# Patient Record
Sex: MALE | Race: BLACK OR AFRICAN AMERICAN | Employment: FULL TIME | URBAN - METROPOLITAN AREA
[De-identification: names, ages, dates, MRNs, and addresses within clinical notes are randomized per-mention and may not be internally consistent; named-entity substitution may affect disease eponyms.]

---

## 2021-11-05 ENCOUNTER — HOSPITAL ENCOUNTER (EMERGENCY)
Age: 56
Discharge: HOME | End: 2021-11-05
Attending: EMERGENCY MEDICINE

## 2021-11-05 ENCOUNTER — APPOINTMENT (OUTPATIENT)
Dept: GENERAL RADIOLOGY | Age: 56
End: 2021-11-05

## 2021-11-05 VITALS
HEART RATE: 75 BPM | RESPIRATION RATE: 23 BRPM | TEMPERATURE: 97.8 F | DIASTOLIC BLOOD PRESSURE: 87 MMHG | SYSTOLIC BLOOD PRESSURE: 132 MMHG | OXYGEN SATURATION: 100 % | BODY MASS INDEX: 29.02 KG/M2 | HEIGHT: 64 IN | WEIGHT: 170 LBS

## 2021-11-05 DIAGNOSIS — R07.9 CHEST PAIN, UNSPECIFIED TYPE: Primary | ICD-10-CM

## 2021-11-05 LAB
A/G RATIO: 1.6 (ref 1.1–2.2)
ALBUMIN SERPL-MCNC: 4.6 G/DL (ref 3.4–5)
ALP BLD-CCNC: 77 U/L (ref 40–129)
ALT SERPL-CCNC: 21 U/L (ref 10–40)
ANION GAP SERPL CALCULATED.3IONS-SCNC: 12 MMOL/L (ref 3–16)
AST SERPL-CCNC: 27 U/L (ref 15–37)
BASOPHILS ABSOLUTE: 0.1 K/UL (ref 0–0.2)
BASOPHILS RELATIVE PERCENT: 1 %
BILIRUB SERPL-MCNC: 0.5 MG/DL (ref 0–1)
BUN BLDV-MCNC: 14 MG/DL (ref 7–20)
CALCIUM SERPL-MCNC: 9.7 MG/DL (ref 8.3–10.6)
CHLORIDE BLD-SCNC: 101 MMOL/L (ref 99–110)
CO2: 26 MMOL/L (ref 21–32)
CREAT SERPL-MCNC: 1 MG/DL (ref 0.9–1.3)
EOSINOPHILS ABSOLUTE: 0.1 K/UL (ref 0–0.6)
EOSINOPHILS RELATIVE PERCENT: 1.9 %
GFR AFRICAN AMERICAN: >60
GFR NON-AFRICAN AMERICAN: >60
GLUCOSE BLD-MCNC: 185 MG/DL (ref 70–99)
HCT VFR BLD CALC: 38 % (ref 40.5–52.5)
HEMOGLOBIN: 13.4 G/DL (ref 13.5–17.5)
LYMPHOCYTES ABSOLUTE: 2.4 K/UL (ref 1–5.1)
LYMPHOCYTES RELATIVE PERCENT: 41 %
MCH RBC QN AUTO: 28.7 PG (ref 26–34)
MCHC RBC AUTO-ENTMCNC: 35.2 G/DL (ref 31–36)
MCV RBC AUTO: 81.7 FL (ref 80–100)
MONOCYTES ABSOLUTE: 0.3 K/UL (ref 0–1.3)
MONOCYTES RELATIVE PERCENT: 5.8 %
NEUTROPHILS ABSOLUTE: 2.9 K/UL (ref 1.7–7.7)
NEUTROPHILS RELATIVE PERCENT: 50.3 %
PDW BLD-RTO: 13.7 % (ref 12.4–15.4)
PLATELET # BLD: 209 K/UL (ref 135–450)
PMV BLD AUTO: 7.3 FL (ref 5–10.5)
POTASSIUM REFLEX MAGNESIUM: 4.8 MMOL/L (ref 3.5–5.1)
RBC # BLD: 4.65 M/UL (ref 4.2–5.9)
SODIUM BLD-SCNC: 139 MMOL/L (ref 136–145)
TOTAL PROTEIN: 7.5 G/DL (ref 6.4–8.2)
TROPONIN: <0.01 NG/ML
TROPONIN: <0.01 NG/ML
WBC # BLD: 5.8 K/UL (ref 4–11)

## 2021-11-05 PROCEDURE — 6370000000 HC RX 637 (ALT 250 FOR IP): Performed by: EMERGENCY MEDICINE

## 2021-11-05 PROCEDURE — 85025 COMPLETE CBC W/AUTO DIFF WBC: CPT

## 2021-11-05 PROCEDURE — 99284 EMERGENCY DEPT VISIT MOD MDM: CPT

## 2021-11-05 PROCEDURE — 84484 ASSAY OF TROPONIN QUANT: CPT

## 2021-11-05 PROCEDURE — 93005 ELECTROCARDIOGRAM TRACING: CPT | Performed by: EMERGENCY MEDICINE

## 2021-11-05 PROCEDURE — 71045 X-RAY EXAM CHEST 1 VIEW: CPT

## 2021-11-05 PROCEDURE — 80053 COMPREHEN METABOLIC PANEL: CPT

## 2021-11-05 RX ORDER — ASPIRIN 325 MG
325 TABLET ORAL ONCE
Status: COMPLETED | OUTPATIENT
Start: 2021-11-05 | End: 2021-11-05

## 2021-11-05 ASSESSMENT — PAIN DESCRIPTION - DESCRIPTORS: DESCRIPTORS: PRESSURE

## 2021-11-05 ASSESSMENT — PAIN DESCRIPTION - PAIN TYPE: TYPE: ACUTE PAIN

## 2021-11-05 ASSESSMENT — PAIN DESCRIPTION - FREQUENCY: FREQUENCY: CONTINUOUS

## 2021-11-05 ASSESSMENT — PAIN DESCRIPTION - LOCATION: LOCATION: CHEST

## 2021-11-05 ASSESSMENT — PAIN SCALES - GENERAL: PAINLEVEL_OUTOF10: 6

## 2021-11-05 NOTE — ED NOTES
Patient given  discharge instructions verbal and written, patient verbalized understanding. Alert/oriented X4, Clear speech.   Patient exhibits no distress, ambulates with steady gait per self leaving unit, no further request.     Fern Pal RN  11/05/21 3943

## 2021-11-06 LAB
EKG ATRIAL RATE: 65 BPM
EKG ATRIAL RATE: 73 BPM
EKG DIAGNOSIS: NORMAL
EKG DIAGNOSIS: NORMAL
EKG P AXIS: 54 DEGREES
EKG P AXIS: 65 DEGREES
EKG P-R INTERVAL: 148 MS
EKG P-R INTERVAL: 152 MS
EKG Q-T INTERVAL: 362 MS
EKG Q-T INTERVAL: 378 MS
EKG QRS DURATION: 74 MS
EKG QRS DURATION: 80 MS
EKG QTC CALCULATION (BAZETT): 393 MS
EKG QTC CALCULATION (BAZETT): 398 MS
EKG R AXIS: 19 DEGREES
EKG R AXIS: 32 DEGREES
EKG T AXIS: 20 DEGREES
EKG T AXIS: 21 DEGREES
EKG VENTRICULAR RATE: 65 BPM
EKG VENTRICULAR RATE: 73 BPM

## 2021-11-06 PROCEDURE — 93010 ELECTROCARDIOGRAM REPORT: CPT | Performed by: INTERNAL MEDICINE

## 2021-11-07 ASSESSMENT — ENCOUNTER SYMPTOMS
DIARRHEA: 0
PHOTOPHOBIA: 0
RHINORRHEA: 0
NAUSEA: 0
SHORTNESS OF BREATH: 0
COUGH: 0
VOMITING: 0
BACK PAIN: 0
WHEEZING: 0
ABDOMINAL PAIN: 0

## 2021-11-07 NOTE — ED PROVIDER NOTES
Emergency Department Provider Note  Location: [address] [place] [address]  11/5/2021     Patient Identification  Joslyn Becerra is a 55 y.o. male    Chief Complaint  Chest Pain          HPI  (History provided by patient)  Patient is a 56-year-old male history of diabetes non-smoker no family history cardiac disease who presents with left arm pain. Patient reports it started early yesterday and has continued for more than 24 hours fairly persistently. Described as a achy sensation mainly at the insertion point of the bicep. There is no exacerbating or alleviating factors. Denies any known injury. There is no swelling there is no numbness or weakness. Patient reports he felt that at times though it did radiate up to the shoulder possibly to his left chest but seems unsure. There is no shortness of breath no other chest or back pain or neck pain no diaphoresis nausea or other autonomic symptoms. I have reviewed the following nursing documentation:  Allergies: No Known Allergies    Past medical history:  has a past medical history of Diabetes mellitus ([facility] [address] Past surgical history:  has no past surgical history on file. Home medications:   Prior to Admission medications    Not on File       Social history:  reports that he has never smoked. He has never used smokeless tobacco. He reports previous alcohol use. He reports previous drug use. Family history:  History reviewed. No pertinent family history. ROS  Review of Systems   Constitutional: Negative for chills and fever. HENT: Negative for congestion and rhinorrhea. Eyes: Negative for photophobia and visual disturbance. Respiratory: Negative for cough, shortness of breath and wheezing. Cardiovascular: Negative for chest pain and palpitations. Gastrointestinal: Negative for abdominal pain, diarrhea, nausea and vomiting. Genitourinary: Negative for dysuria and hematuria.    Musculoskeletal: Negative for back pain and neck pain.   Skin: Negative for rash and wound. Neurological: Negative for syncope and weakness. Psychiatric/Behavioral: Negative for agitation and confusion. Exam  ED Triage Vitals [11/05/21 1216]   BP Temp Temp Source Pulse Resp SpO2 Height Weight   (!) 148/85 97.8 °F (36.6 °C) Oral 74 14 100 % 5' 4\" (1.626 m) 170 lb (77.1 kg)       Physical Exam  Vitals and nursing note reviewed. Constitutional:       General: He is not in acute distress. Appearance: He is well-developed. HENT:      Head: Normocephalic and atraumatic. Nose: Nose normal. No congestion. Eyes:      Extraocular Movements: Extraocular movements intact. Pupils: Pupils are equal, round, and reactive to light. Cardiovascular:      Rate and Rhythm: Normal rate and regular rhythm. Heart sounds: No murmur heard. Comments: Equal radial pulses  Pulmonary:      Effort: Pulmonary effort is normal.      Breath sounds: Normal breath sounds. Comments: No chest wall tenderness or crepitus  Abdominal:      General: There is no distension. Palpations: Abdomen is soft. Tenderness: There is no abdominal tenderness. Musculoskeletal:         General: No deformity. Normal range of motion. Cervical back: Normal range of motion and neck supple. Comments: Normal-appearing upper extremities, no tenderness or swelling neurovascularly intact strength is preserved   Skin:     General: Skin is warm. Findings: No rash. Neurological:      Mental Status: He is alert and oriented to person, place, and time. Sensory: No sensory deficit. Motor: No weakness or abnormal muscle tone.       Coordination: Coordination normal.   Psychiatric:         Mood and Affect: Mood normal.         Behavior: Behavior normal.           ED Course    ED Medication Orders (From admission, onward)    Start Ordered     Status Ordering Provider    11/05/21 1245 11/05/21 1233  aspirin tablet 325 mg  ONCE         Last MAR action: Given - by Adebayo Appiah on 11/05/21 at 1248 ANIBAL OWSALD          EKG  Normal sinus rhythm rate is 75 normal axis normal intervals no evidence of conduction abnormalities or diagnostic ischemic changes noted,       Radiology  No results found.       Labs  Results for orders placed or performed during the hospital encounter of 11/05/21   CBC Auto Differential   Result Value Ref Range    WBC 5.8 4.0 - 11.0 K/uL    RBC 4.65 4.20 - 5.90 M/uL    Hemoglobin 13.4 (L) 13.5 - 17.5 g/dL    Hematocrit 38.0 (L) 40.5 - 52.5 %    MCV 81.7 80.0 - 100.0 fL    MCH 28.7 26.0 - 34.0 pg    MCHC 35.2 31.0 - 36.0 g/dL    RDW 13.7 12.4 - 15.4 %    Platelets 382 307 - 719 K/uL    MPV 7.3 5.0 - 10.5 fL    Neutrophils % 50.3 %    Lymphocytes % 41.0 %    Monocytes % 5.8 %    Eosinophils % 1.9 %    Basophils % 1.0 %    Neutrophils Absolute 2.9 1.7 - 7.7 K/uL    Lymphocytes Absolute 2.4 1.0 - 5.1 K/uL    Monocytes Absolute 0.3 0.0 - 1.3 K/uL    Eosinophils Absolute 0.1 0.0 - 0.6 K/uL    Basophils Absolute 0.1 0.0 - 0.2 K/uL   Comprehensive Metabolic Panel w/ Reflex to MG   Result Value Ref Range    Sodium 139 136 - 145 mmol/L    Potassium reflex Magnesium 4.8 3.5 - 5.1 mmol/L    Chloride 101 99 - 110 mmol/L    CO2 26 21 - 32 mmol/L    Anion Gap 12 3 - 16    Glucose 185 (H) 70 - 99 mg/dL    BUN 14 7 - 20 mg/dL    CREATININE 1.0 0.9 - 1.3 mg/dL    GFR Non-African American >60 >60    GFR African American >60 >60    Calcium 9.7 8.3 - 10.6 mg/dL    Total Protein 7.5 6.4 - 8.2 g/dL    Albumin 4.6 3.4 - 5.0 g/dL    Albumin/Globulin Ratio 1.6 1.1 - 2.2    Total Bilirubin 0.5 0.0 - 1.0 mg/dL    Alkaline Phosphatase 77 40 - 129 U/L    ALT 21 10 - 40 U/L    AST 27 15 - 37 U/L   Troponin   Result Value Ref Range    Troponin <0.01 <0.01 ng/mL   Troponin   Result Value Ref Range    Troponin <0.01 <0.01 ng/mL   EKG 12 Lead   Result Value Ref Range    Ventricular Rate 73 BPM    Atrial Rate 73 BPM    P-R Interval 152 ms    QRS Duration 80 ms    Q-T Interval 362 ms    QTc Calculation (Bazett) 398 ms    P Axis 54 degrees    R Axis 19 degrees    T Axis 20 degrees    Diagnosis       Normal sinus rhythmPossible Left atrial enlargementBorderline ECGConfirmed by [facility] FABIOLA PAZ MD ([id number]) on 11/6/2021 6:19:02 AM   EKG 12 Lead   Result Value Ref Range    Ventricular Rate 65 BPM    Atrial Rate 65 BPM    P-R Interval 148 ms    QRS Duration 74 ms    Q-T Interval 378 ms    QTc Calculation (Bazett) 393 ms    P Axis 65 degrees    R Axis 32 degrees    T Axis 21 degrees    Diagnosis       Normal sinus rhythmPossible Left atrial enlargementBorderline ECGConfirmed by [facility] FABIOLA PAZ MD ([id number]) on 11/6/2021 6:22:54 AM         [facility]  Patient seen and evaluated. Relevant records reviewed. 43-year-old male presents with nonspecific left-sided arm pain for over 24 hours. Patient is well-appearing no acute distress his vitals are reassuring. His evaluation on exam is unremarkable. EKG without any strain pattern on serial EKGs. Serial troponins are negative. No metabolic derangements. Unclear cause of his arm pain. I have low concern for ACS. His calculated heart score is 2. We will refer him for outpatient cardiology and I have discussed return precautions at length. Patient agreeable to plan expressed understanding of plan. Clinical Impression:  1. Chest pain, unspecified type          Disposition:  Discharge to home in good condition. Blood pressure 132/87, pulse 75, temperature 97.8 °F (36.6 °C), temperature source Oral, resp. rate 23, height 5' 4\" (1.626 m), weight 170 lb (77.1 kg), SpO2 100 %. Patient was given scripts for the following medications. I counseled patient how to take these medications. There are no discharge medications for this patient. Disposition referral (if applicable):  Carrie Schmitz MD  [phone]  [address] [facility] [place]    Schedule an appointment as soon as possible for a visit           Total critical care time is 0 minutes, which excludes separately billable procedures and updating family. Time spent is specifically for management of the presenting complaint and symptoms initially, direct bedside care, reevaluation, review of records, and consultation. There was a high probability of clinically significant life-threatening deterioration in the patient's condition, which required my urgent intervention. This chart was generated in part by using Dragon Dictation system and may contain errors related to that system including errors in grammar, punctuation, and spelling, as well as words and phrases that may be inappropriate. If there are any questions or concerns please feel free to contact the dictating provider for clarification.      Berna Baumgarten, MD [address], MD  11/07/21 8360